# Patient Record
Sex: FEMALE | Race: WHITE | URBAN - METROPOLITAN AREA
[De-identification: names, ages, dates, MRNs, and addresses within clinical notes are randomized per-mention and may not be internally consistent; named-entity substitution may affect disease eponyms.]

---
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
## 2025-03-10 ENCOUNTER — TELEPHONE (OUTPATIENT)
Age: 20
End: 2025-03-10

## 2025-03-10 NOTE — TELEPHONE ENCOUNTER
Called and left message for patient to call back office.   Calling to schedule patient on 3/13 at 11:45am for milium.

## 2025-03-13 ENCOUNTER — OFFICE VISIT (OUTPATIENT)
Age: 20
End: 2025-03-13

## 2025-03-13 DIAGNOSIS — L72.0 MILIUM: Primary | ICD-10-CM

## 2025-03-13 NOTE — PROGRESS NOTES
[facility] Dermatology  Maris Gill M.D.  [phone]       Larry Jacobo  2005    19 y.o. female     Date of Visit: 3/13/2025    Chief Complaint:   Chief Complaint   Patient presents with    Skin Lesion        I was asked to see this patient by Dr. Sylvester ref. provider found.    History of Present Illness:  1.  Patient presents today with her mother for evaluation of milium.  Increasing in size, becoming pruritic.  Present for many months.  Had previously been asymptomatic.  Has not had previous removal      Review of Systems:  Constitutional: Reports general sense of well-being       Past Medical History, Surgical History, Family History, Medications and Allergies reviewed.    Social History:   Social History     Socioeconomic History    Marital status: Single     Spouse name: Not on file    Number of children: Not on file    Years of education: Not on file    Highest education level: Not on file   Occupational History    Not on file   Tobacco Use    Smoking status: Never    Smokeless tobacco: Never   Vaping Use    Vaping status: Never Used   Substance and Sexual Activity    Alcohol use: No    Drug use: No    Sexual activity: Never   Other Topics Concern    Not on file   Social History Narrative    Not on file     Social Drivers of Health     Financial Resource Strain: Not on file   Food Insecurity: No Transportation Needs (6/10/2024)    Received from [employer] [employer]  [employer]    Yearly Questionnaire     Do you need any assistance with obtaining housing, meals, medication, transportation or medical equipment?: No     Assistance needed for:: Not on file   Transportation Needs: No Transportation Needs (6/10/2024)    Received from [employer],  [employer],  [employer]    Yearly Questionnaire     Do you need any assistance with obtaining housing, meals, medication, transportation or medical equipment?: No     Assistance needed for:: Not on file   Physical Activity: Not on file   Stress: Not on file   Social